# Patient Record
Sex: MALE | URBAN - METROPOLITAN AREA
[De-identification: names, ages, dates, MRNs, and addresses within clinical notes are randomized per-mention and may not be internally consistent; named-entity substitution may affect disease eponyms.]

---

## 2020-11-04 ENCOUNTER — OFFICE VISIT (OUTPATIENT)
Dept: URBAN - METROPOLITAN AREA CLINIC 25 | Facility: CLINIC | Age: 40
End: 2020-11-04
Payer: COMMERCIAL

## 2020-11-04 DIAGNOSIS — T15.01XA FB IN CORNEA OF RIGHT EYE, INITIAL ENCOUNTER: Primary | ICD-10-CM

## 2020-11-04 PROCEDURE — 65222 REMOVE FOREIGN BODY FROM EYE: CPT | Performed by: OPTOMETRIST

## 2020-11-04 RX ORDER — CIPROFLOXACIN HYDROCHLORIDE 3.5 MG/ML
0.3 % SOLUTION/ DROPS TOPICAL
Qty: 1 | Refills: 0 | Status: ACTIVE
Start: 2020-11-04

## 2020-11-04 NOTE — IMPRESSION/PLAN
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 Plan: pt edu. no complications. rx cipro for 7 days. rtc prn.

## 2020-11-18 ENCOUNTER — OFFICE VISIT (OUTPATIENT)
Dept: URBAN - METROPOLITAN AREA CLINIC 25 | Facility: CLINIC | Age: 40
End: 2020-11-18
Payer: COMMERCIAL

## 2020-11-18 DIAGNOSIS — T15.01XD FB IN CORNEA OF RIGHT EYE, SUBSEQUENT ENCOUNTER: Primary | ICD-10-CM

## 2020-11-18 DIAGNOSIS — H18.231 SECONDARY CORNEAL EDEMA, RIGHT EYE: ICD-10-CM

## 2020-11-18 PROCEDURE — 92014 COMPRE OPH EXAM EST PT 1/>: CPT | Performed by: OPTOMETRIST

## 2020-11-18 RX ORDER — PREDNISOLONE ACETATE 10 MG/ML
1 % SUSPENSION/ DROPS OPHTHALMIC
Qty: 5 | Refills: 0 | Status: ACTIVE
Start: 2020-11-18

## 2020-11-18 ASSESSMENT — INTRAOCULAR PRESSURE
OD: 20
OS: 20

## 2020-11-18 NOTE — IMPRESSION/PLAN
Impression: FB in cornea of right eye, subsequent encounter: T15.01XD. Plan: pt edu. start pred forte for 1-2 weeks. rtc prn.